# Patient Record
Sex: FEMALE | Race: OTHER | Employment: UNEMPLOYED | URBAN - METROPOLITAN AREA
[De-identification: names, ages, dates, MRNs, and addresses within clinical notes are randomized per-mention and may not be internally consistent; named-entity substitution may affect disease eponyms.]

---

## 2023-12-27 ENCOUNTER — HOSPITAL ENCOUNTER (EMERGENCY)
Age: 38
Discharge: HOME | End: 2023-12-27
Attending: EMERGENCY MEDICINE
Payer: MEDICAID

## 2023-12-27 VITALS
DIASTOLIC BLOOD PRESSURE: 74 MMHG | SYSTOLIC BLOOD PRESSURE: 108 MMHG | RESPIRATION RATE: 16 BRPM | TEMPERATURE: 99 F | HEART RATE: 70 BPM | OXYGEN SATURATION: 100 %

## 2023-12-27 DIAGNOSIS — K02.9 PAIN DUE TO DENTAL CARIES: Primary | ICD-10-CM

## 2023-12-27 PROCEDURE — 6370000000 HC RX 637 (ALT 250 FOR IP)

## 2023-12-27 PROCEDURE — 6360000002 HC RX W HCPCS

## 2023-12-27 PROCEDURE — 99284 EMERGENCY DEPT VISIT MOD MDM: CPT

## 2023-12-27 PROCEDURE — 96372 THER/PROPH/DIAG INJ SC/IM: CPT

## 2023-12-27 RX ORDER — ACETAMINOPHEN 500 MG
1000 TABLET ORAL 3 TIMES DAILY
Qty: 30 TABLET | Refills: 0 | Status: SHIPPED | OUTPATIENT
Start: 2023-12-27 | End: 2024-01-01

## 2023-12-27 RX ORDER — PENICILLIN V POTASSIUM 500 MG/1
500 TABLET ORAL 4 TIMES DAILY
Qty: 28 TABLET | Refills: 0 | Status: SHIPPED | OUTPATIENT
Start: 2023-12-27 | End: 2024-01-03

## 2023-12-27 RX ORDER — PENICILLIN V POTASSIUM 250 MG/1
500 TABLET ORAL ONCE
Status: COMPLETED | OUTPATIENT
Start: 2023-12-27 | End: 2023-12-27

## 2023-12-27 RX ORDER — IBUPROFEN 200 MG
600 TABLET ORAL EVERY 8 HOURS PRN
Qty: 30 TABLET | Refills: 0 | Status: SHIPPED | OUTPATIENT
Start: 2023-12-27 | End: 2024-01-01

## 2023-12-27 RX ORDER — KETOROLAC TROMETHAMINE 15 MG/ML
15 INJECTION, SOLUTION INTRAMUSCULAR; INTRAVENOUS ONCE
Status: COMPLETED | OUTPATIENT
Start: 2023-12-27 | End: 2023-12-27

## 2023-12-27 RX ADMIN — PENICILLIN V POTASSIUM 500 MG: 250 TABLET ORAL at 14:58

## 2023-12-27 RX ADMIN — KETOROLAC TROMETHAMINE 15 MG: 15 INJECTION, SOLUTION INTRAMUSCULAR; INTRAVENOUS at 14:57

## 2023-12-27 NOTE — DISCHARGE INSTRUCTIONS
You were seen for evaluation of dental pain. You will be given an antibiotic called penicillin. You need to take the entire course of penicillin and do not skip any of the doses. You will also be given ibuprofen and Tylenol that you can alternate taking every 4 hours to help with pain. You have a list of dental clinics in your paperwork that you need to call to schedule an appointment for reevaluation of your teeth. Return to the emergency department for any worsening pain, swelling, inability to swallow, shortness of breath, chest pain, fevers, other new or concerning symptoms.

## 2023-12-27 NOTE — ED NOTES
Pt presents to the ER via triage ambulatory. Pt c/o dental pain. Pt states over a year ago she got two teeth pulled and wanted her teeth replaced. Pt does not speak any English, and mobile  at bedside to communicate with patient. Pt states she is having pain and thinks she has an infection. Pt otherwise A&O x4, in NAD, VSS.

## 2023-12-27 NOTE — ED PROVIDER NOTES
[address] ED  Emergency Department Encounter  Emergency Medicine Resident     Pt Name:Anne George  MRN: [id number]  [facility] 1985  Date of evaluation: 12/27/23  PCP:  No primary care provider on file. Note Started: 2:41 PM EST      CHIEF COMPLAINT       Chief Complaint   Patient presents with    Dental Pain       HISTORY OF PRESENT ILLNESS  (Location/Symptom, Timing/Onset, Context/Setting, Quality, Duration, Modifying Factors, Severity.)      Armin Arnold is a 41 y.o. female with no significant past medical history who presents with dental pain that has been going on for at least a year. 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 Patient states she had several of her teeth extracted in 2021. States since then she has had dental pain and swelling. The pain in her teeth has worsened over the past year. It is most painful in her right posterior most upper molar. She denies swelling or drainage from the area. Patient does not follow with a dentist.  No fevers or chills, chest pain, shortness of breath    PAST MEDICAL / SURGICAL / SOCIAL / FAMILY HISTORY      has no past medical history on file. has no past surgical history on file.       Social History     Socioeconomic History    Marital status:      Spouse name: Not on file    Number of children: Not on file    Years of education: Not on file    Highest education level: Not on file   Occupational History    Not on file   Tobacco Use    Smoking status: Not on file    Smokeless tobacco: Not on file   Substance and Sexual Activity    Alcohol use: Not on file    Drug use: Not on file    Sexual activity: Not on file   Other Topics Concern    Not on file   Social History Narrative    Not on file     Social Determinants of Health     Financial Resource Strain: Not on file   Food Insecurity: Not on file   Transportation Needs: Not on file   Physical Activity: Not on file   Stress: Not on

## 2024-03-03 ENCOUNTER — HOSPITAL ENCOUNTER (EMERGENCY)
Age: 39
Discharge: HOME | End: 2024-03-03
Attending: EMERGENCY MEDICINE
Payer: MEDICAID

## 2024-03-03 VITALS
RESPIRATION RATE: 20 BRPM | OXYGEN SATURATION: 100 % | TEMPERATURE: 97.8 F | SYSTOLIC BLOOD PRESSURE: 99 MMHG | DIASTOLIC BLOOD PRESSURE: 69 MMHG | HEART RATE: 89 BPM

## 2024-03-03 DIAGNOSIS — K08.89 PAIN, DENTAL: ICD-10-CM

## 2024-03-03 DIAGNOSIS — K05.10 GINGIVITIS: Primary | ICD-10-CM

## 2024-03-03 PROCEDURE — 99283 EMERGENCY DEPT VISIT LOW MDM: CPT

## 2024-03-03 PROCEDURE — 6370000000 HC RX 637 (ALT 250 FOR IP)

## 2024-03-03 RX ORDER — IBUPROFEN 600 MG/1
600 TABLET ORAL 2 TIMES DAILY PRN
Qty: 30 TABLET | Refills: 0 | Status: SHIPPED | OUTPATIENT
Start: 2024-03-03

## 2024-03-03 RX ORDER — AMOXICILLIN 875 MG/1
875 TABLET, COATED ORAL EVERY 12 HOURS SCHEDULED
Status: DISCONTINUED | OUTPATIENT
Start: 2024-03-03 | End: 2024-03-03

## 2024-03-03 RX ORDER — IBUPROFEN 400 MG/1
600 TABLET ORAL 2 TIMES DAILY PRN
Status: DISCONTINUED | OUTPATIENT
Start: 2024-03-03 | End: 2024-03-03

## 2024-03-03 RX ORDER — AMOXICILLIN 500 MG/1
500 TABLET, FILM COATED ORAL EVERY 8 HOURS SCHEDULED
Qty: 20 TABLET | Refills: 0 | Status: SHIPPED | OUTPATIENT
Start: 2024-03-03

## 2024-03-03 RX ADMIN — IBUPROFEN 600 MG: 400 TABLET, FILM COATED ORAL at 14:52

## 2024-03-03 NOTE — DISCHARGE INSTRUCTIONS
Take Ibuprofen as needed for pain     Take Amoxicillin as prescribed for 7 days     Follow-up with dentist as soon as possible    Return to the emergency department if symptoms worsened and severe pain develop, if loosening of the tooth develops. Other reasons to visit the ED again include fever, chills, shortness of breath, severe headache, or any worsening symptoms.

## 2024-03-03 NOTE — ED PROVIDER NOTES
[facility] ED     Emergency Department     Faculty Attestation        I performed a history and physical examination of the patient and discussed management with the resident. I reviewed the resident’s note and agree with the documented findings and plan of care. Any areas of disagreement are noted on the chart. I was personally present for the key portions of any procedures. I have documented in the chart those procedures where I was not present during the key portions. I have reviewed the emergency nurses triage note. I agree with the chief complaint, past medical history, past surgical history, allergies, medications, social and family history as documented unless otherwise noted below.    For mid-level providers such as nurse practitioners as well as physicians assistants:    I have personally seen and evaluated the patient.    I find the patient's history and physical exam are consistent with NP/PA documentation.  I agree with the care provided, treatment rendered, disposition, & follow-up plan.     Additional findings are as noted.    Vital Signs: BP 99/69   Pulse 89   Temp 97.8 °F (36.6 °C) (Oral)   Resp 20   SpO2 100%   PCP:  No primary care provider on file.    Pertinent Comments:     Patient with no pain.  There is no evidence of abscess.  No trismus.  Normal voice.  Handling secretions.      Critical Care  None          Harjinder Ellis MD    Attending Emergency Medicine Physician            Matt Ellis MD  03/03/24 2461

## 2024-03-03 NOTE — ED PROVIDER NOTES
[facility] ED  Emergency Department Encounter  Emergency Medicine Resident     Pt Name:Anne Sagastume  MRN: [id number]  Birthdate 1985  Date of evaluation: 3/3/24  PCP:  No primary care provider on file.  Note Started: 4:04 PM EST      CHIEF COMPLAINT       Chief Complaint   Patient presents with    Dental Pain       HISTORY OF PRESENT ILLNESS  (Location/Symptom, Timing/Onset, Context/Setting, Quality, Duration, Modifying Factors, Severity.)      Anne Sagastume is a 38 y.o. female  needed for the whole visit by remote [place]  service.  Patient who presents with painful gingival for 2 weeks duration.  Currently not in any pain but been having gingival pain that comes and goes for 2 weeks associated with bleeding bilaterally.  Patient denies any recent dental work, dental caries and reports a great dental hygiene.  Patient does not have a PCP or dentist at this time.  No similar symptoms in the past.  Pain is 6/10 when it is there.  Given Motrin for her pain and felt better.  Amoxicillin added for 7 days.  Dentist follow-up visit needed for evaluation and treatment for gingivitis and receding gum.  Patient otherwise healthy and denies any symptoms including but not limited to fever, chills, headache or any other symptoms.     PAST MEDICAL / SURGICAL / SOCIAL / FAMILY HISTORY     No past medical history family chronic conditions       has no past surgical history on file.      Social History     Socioeconomic History    Marital status:      Spouse name: Not on file    Number of children: Not on file    Years of education: Not on file    Highest education level: Not on file   Occupational History    Not on file   Tobacco Use    Smoking status: Not on file    Smokeless tobacco: Not on file   Substance and Sexual Activity    Alcohol use: Not on file    Drug use: Not on file    Sexual activity: Not on file   Other Topics Concern    Not on file   Social History Narrative

## 2024-06-28 ENCOUNTER — HOSPITAL ENCOUNTER (EMERGENCY)
Age: 39
Discharge: HOME | End: 2024-06-28
Payer: MEDICAID

## 2024-06-28 VITALS — RESPIRATION RATE: 18 BRPM | OXYGEN SATURATION: 100 % | TEMPERATURE: 97.5 F | HEART RATE: 66 BPM

## 2024-06-28 DIAGNOSIS — K05.6 PERIODONTAL DISEASE, UNSPECIFIED: Primary | ICD-10-CM

## 2024-06-28 PROCEDURE — 99213 OFFICE O/P EST LOW 20 MIN: CPT | Performed by: NURSE PRACTITIONER

## 2024-06-28 PROCEDURE — 99213 OFFICE O/P EST LOW 20 MIN: CPT

## 2024-06-28 RX ORDER — NAPROXEN 500 MG/1
500 TABLET ORAL 2 TIMES DAILY PRN
Qty: 20 TABLET | Refills: 0 | Status: SHIPPED | OUTPATIENT
Start: 2024-06-28 | End: 2024-07-08

## 2024-06-28 RX ORDER — CHLORHEXIDINE GLUCONATE ORAL RINSE 1.2 MG/ML
15 SOLUTION DENTAL 3 TIMES DAILY
Qty: 118 ML | Refills: 0 | Status: SHIPPED | OUTPATIENT
Start: 2024-06-28 | End: 2024-07-12

## 2024-06-28 ASSESSMENT — PAIN DESCRIPTION - LOCATION: LOCATION: TEETH

## 2024-06-28 ASSESSMENT — PAIN DESCRIPTION - ORIENTATION: ORIENTATION: MID;LOWER

## 2024-06-28 ASSESSMENT — PAIN - FUNCTIONAL ASSESSMENT: PAIN_FUNCTIONAL_ASSESSMENT: 0-10

## 2024-06-28 NOTE — ED PROVIDER NOTES
[facility] URGENT CARE  UrgentCare Encounter      CHIEFCOMPLAINT       Chief Complaint   Patient presents with    Dental Pain     Mid lower       Nurses Notes reviewed and I agree except as noted in the HPI.  HISTORY OF PRESENT ILLNESS   Anne Sagastume is a 39 y.o. female who presents to urgent care with complaints of dental pain for approximately 1 year.  She states that she has loose teeth.  Denies any trauma or injury.    REVIEW OF SYSTEMS     Review of Systems   HENT:  Positive for dental problem.        PAST MEDICAL HISTORY   History reviewed. No pertinent past medical history.    SURGICAL HISTORY     Patient  has a past surgical history that includes  section.    CURRENT MEDICATIONS       Discharge Medication List as of 2024 11:06 AM          ALLERGIES     Patient is has No Known Allergies.    FAMILY HISTORY     Patient'sfamily history is not on file.    SOCIAL HISTORY     Patient  reports that she has never smoked. She has never used smokeless tobacco. She reports that she does not drink alcohol and does not use drugs.    PHYSICAL EXAM     ED TRIAGE VITALS   , Temp: 97.5 °F (36.4 °C), Pulse: 66, Respirations: 18, SpO2: 100 %  Physical Exam  Vitals and nursing note reviewed.   Constitutional:       General: She is not in acute distress.     Appearance: Normal appearance. She is not ill-appearing or toxic-appearing.   HENT:      Head: Normocephalic and atraumatic.      Nose: No congestion or rhinorrhea.      Mouth/Throat:      Mouth: Mucous membranes are moist.      Dentition: Abnormal dentition.      Pharynx: Oropharynx is clear.        Comments: Patient has several loose teeth.  There is significant gingival recession throughout.   Eyes:      Extraocular Movements: Extraocular movements intact.      Conjunctiva/sclera: Conjunctivae normal.      Pupils: Pupils are equal, round, and reactive to light.   Cardiovascular:      Rate and Rhythm: Normal rate and regular rhythm.      Pulses:  Details   chlorhexidine (PERIDEX) 0.12 % solution Swish and spit 15 mLs 3 times daily for 14 days, Disp-118 mL, R-0Normal      naproxen (NAPROSYN) 500 MG tablet Take 1 tablet by mouth 2 times daily as needed for Pain, Disp-20 tablet, R-0Normal           Discharge Medication List as of 6/28/2024 11:06 AM          Dorie Garzon, VILMA - XUAN Garzon, VILMA Cruz - XUAN  06/28/24 1513

## 2024-06-28 NOTE — ED TRIAGE NOTES
Pt with complaints of all over dental pain that started 6 months ago. States mid bottom teeth and right upper teeth in back hurt th most. State she has been unable to eat or brush her teeth. States she has been seen by a dentist and they removed 2 teeth.

## 2025-02-06 ENCOUNTER — HOSPITAL ENCOUNTER (OUTPATIENT)
Dept: ULTRASOUND IMAGING | Age: 40
Discharge: HOME | End: 2025-02-06
Payer: COMMERCIAL

## 2025-02-06 DIAGNOSIS — O35.06X0 VENTRICULOMEGALY OF BRAIN ON FETAL ULTRASOUND: ICD-10-CM

## 2025-02-06 DIAGNOSIS — O09.523 MULTIGRAVIDA OF ADVANCED MATERNAL AGE IN THIRD TRIMESTER: ICD-10-CM

## 2025-02-06 PROCEDURE — 99211 OFF/OP EST MAY X REQ PHY/QHP: CPT

## 2025-02-06 PROCEDURE — 76816 OB US FOLLOW-UP PER FETUS: CPT

## 2025-02-06 NOTE — PROGRESS NOTES
HT:  5' 4\"  WT:  210.4 Lbs.  95.6 Kg.  BMI- 36.1  T:  97.1 Skin  BP: 107/62  P: 92  R: 20  No problems/concerns.  No CoVid signs/symptoms.

## 2025-03-06 ENCOUNTER — HOSPITAL ENCOUNTER (OUTPATIENT)
Dept: ULTRASOUND IMAGING | Age: 40
Discharge: HOME | End: 2025-03-06
Payer: COMMERCIAL

## 2025-03-06 DIAGNOSIS — O35.09X0 PREGNANCY COMPLICATED BY FETAL CEREBRAL VENTRICULOMEGALY, SINGLE OR UNSPECIFIED FETUS: ICD-10-CM

## 2025-03-06 PROCEDURE — 76816 OB US FOLLOW-UP PER FETUS: CPT

## 2025-03-06 PROCEDURE — 99211 OFF/OP EST MAY X REQ PHY/QHP: CPT

## 2025-03-06 NOTE — PLAN OF CARE
Provider discussed disease process, treatment plan, medications,and discharge instructions.  Patient agrees with plan.  Any questions were answered.  Care plan reviewed with patient.  Goal: No falls during the visit, achieved.   Eren Marshall  Joann # [id number] used throughout the appointment.

## 2025-03-06 NOTE — PROGRESS NOTES
HT: 5' 4\"   WT:   213.4  Lbs.  97 Kg.  BMI-36.6  T:  97.9 Skin  BP: 113/65  P: 79  R:20  Problems/concerns-None.    Beth Solomon in person  used throughout the whole visit. EXT: [id number]